# Patient Record
Sex: FEMALE | URBAN - METROPOLITAN AREA
[De-identification: names, ages, dates, MRNs, and addresses within clinical notes are randomized per-mention and may not be internally consistent; named-entity substitution may affect disease eponyms.]

---

## 2020-07-06 ENCOUNTER — APPOINTMENT (RX ONLY)
Dept: URBAN - METROPOLITAN AREA CLINIC 125 | Facility: CLINIC | Age: 25
Setting detail: DERMATOLOGY
End: 2020-07-06

## 2020-07-06 DIAGNOSIS — L30.9 DERMATITIS, UNSPECIFIED: ICD-10-CM

## 2020-07-06 PROCEDURE — ? TREATMENT REGIMEN

## 2020-07-06 PROCEDURE — 99202 OFFICE O/P NEW SF 15 MIN: CPT

## 2020-07-06 PROCEDURE — ? COUNSELING

## 2020-07-06 ASSESSMENT — LOCATION ZONE DERM: LOCATION ZONE: FACE

## 2020-07-06 ASSESSMENT — LOCATION SIMPLE DESCRIPTION DERM: LOCATION SIMPLE: RIGHT CHEEK

## 2020-07-06 ASSESSMENT — LOCATION DETAILED DESCRIPTION DERM: LOCATION DETAILED: RIGHT CENTRAL MALAR CHEEK

## 2020-07-06 NOTE — HPI: RASH
What Type Of Note Output Would You Prefer (Optional)?: Standard Output
How Severe Is Your Rash?: severe
Is This A New Presentation, Or A Follow-Up?: Rash
Additional History: Patient stated rash started off as a pimple, progressed into more bumps and redness after swimming. Started using neosporin and calamine lotion no improvement. Pt thinks neopsporin worsened it as she woke up with facial swelling and redness the next day.  She thinks she may have a low grade fever.

## 2020-07-06 NOTE — PROCEDURE: TREATMENT REGIMEN
Detail Level: Detailed
Plan: Recommended cold compresses to face to help with swelling.  Hold on Medrol dose pack as given by PCP.  If starting to improve in the next few days then pt can resume the medrol dose pack.  Pt to call if fever worsens or chills develop or rash worsens/spreads.  Photo taken after consent.
Discontinue Regimen: Neosporin and calamine lotion
Continue Regimen: Doxy 100mg bid take with food as prescribed by pcp(patient already has and only started yesterday)\\nMupirocin ointment aaa face bid(pt already has and only started yesterday